# Patient Record
Sex: FEMALE | Race: WHITE
[De-identification: names, ages, dates, MRNs, and addresses within clinical notes are randomized per-mention and may not be internally consistent; named-entity substitution may affect disease eponyms.]

---

## 2020-12-31 ENCOUNTER — HOSPITAL ENCOUNTER (OUTPATIENT)
Dept: HOSPITAL 96 - M.LAB | Age: 50
End: 2020-12-31
Attending: SURGERY
Payer: COMMERCIAL

## 2020-12-31 DIAGNOSIS — Z20.828: ICD-10-CM

## 2020-12-31 DIAGNOSIS — Z01.812: Primary | ICD-10-CM

## 2021-01-06 ENCOUNTER — HOSPITAL ENCOUNTER (OUTPATIENT)
Dept: HOSPITAL 96 - M.SUR | Age: 51
Discharge: HOME | End: 2021-01-06
Attending: SURGERY
Payer: COMMERCIAL

## 2021-01-06 DIAGNOSIS — K80.10: Primary | ICD-10-CM

## 2021-01-06 DIAGNOSIS — R10.11: ICD-10-CM

## 2021-01-06 DIAGNOSIS — Z98.890: ICD-10-CM

## 2021-01-06 DIAGNOSIS — Z79.899: ICD-10-CM

## 2021-01-06 LAB
HCT VFR BLD CALC: 41.6 % (ref 37–47)
HGB BLD-MCNC: 14.1 GM/DL (ref 12–15)

## 2021-01-07 NOTE — OP
[address]  [address]                    OPERATIVE REPORT              
_______________________________________________________________________________
 
Name:       VERONICA DING                   Room:                      [facility]..#:  [id number]      Account #:      [id number]  
Admission:  01/06/21     Attend Phys:    HAI Kidd
Discharge:               Date of Birth:  [birth year]  
         Report #: [id number]
                                                                     [id number]  
_______________________________________________________________________________
THIS REPORT FOR:  
 
cc:  Tessy Villagran Kathleen M. DO                                          ~
     Chay Dickinson DO         
 
DATE OF SERVICE:  01/06/2021
 
 
SURGEON:  Chay Dickinson DO
 
ASSISTANT:  Dr. Emanuel Dallas DO.
 
PREOPERATIVE DIAGNOSIS:  Biliary colic.
 
POSTOPERATIVE DIAGNOSIS:  Biliary colic.
 
PROCEDURE PERFORMED:  Laparoscopic cholecystectomy.
 
INDICATIONS:  The patient is a 50-year-old female who presented as an outpatient
with complaints of postprandial right upper quadrant pain.  History of present
illness, physical exam, and imaging were consistent with biliary colic.  Risks,
benefits, and alternatives to surgery were discussed in detail.  Risks of
bleeding, infection, damage to the nearby structures including bowel and biliary
tree were discussed.  The patient voiced complete understanding and elected to
proceed with surgery.
 
DESCRIPTION OF PROCEDURE:  The patient was taken to the operating theater and
placed in the supine position.  Bilateral SCDs were placed and preoperative
antibiotics were given.  General anesthesia was induced without complication. 
The patient's abdomen was prepped and draped in the standard sterile fashion. 
Timeout was performed and all were in agreement.  An infraumbilical incision was
then made using an 11 blade scalpel.  Dissection was carried down to the midline
using electrocautery and S retractors.  Once the midline fascia was appreciated,
it was scored with electrocautery and elevated into the surgical field using
Kocher clamps.  The abdomen was entered using a hemostat.  Two 0 Vicryl stay
sutures were placed on either edge of the fascia and a Haritha trocar was
placed into
the
abdomen.  The abdomen was then insufflated to 15 mmHg.  Camera was introduced.
 No injury to intra-abdominal contents was appreciated.  The patient was placed
in the head up, left side down position.  A subxiphoid 5 mm port was placed
under direct visualization.  Next two 5 mm right subcostal ports were placed
under direct visualization.  Using the assistant port, the gallbladder fundus
was grasped and elevated towards the anterior abdominal wall.  There were some
omental adhesions to Umer's pouch and these were
 
 
 
[place] [address]                    OPERATIVE REPORT              
_______________________________________________________________________________
 
Name:       VERONICA DING                   [id number]      Account #:      [id number]  
Admission:  01/06/21     Attend Phys:    HAI Kidd
Discharge:               Date of Birth:  [birth year]  
         Report #: [id number]
                                                                     [id number]  
_______________________________________________________________________________
 
taken down using blunt and electrocautery
dissection.  Umer's pouch was then taken medially and cephalad and the
lateral peritoneum of the gallbladder was scored using electrocautery.  The
peritoneum was then incised laterally toward the liver and then carried over
Umer's pouch medially.
Umer's pouch was then taken inferolaterally was carried
over the medial aspect of the gallbladder.  Blunt dissection was then used to
sweep the peritoneum down exposing the hepatocystic triangle and two structures
entering the gallbladder.  The cystic duct and artery were then skeletonized
using blunt dissection clearing the hepatocystic triangle and fibrofatty tissue.
 A small rent in the gallbladder did occur during this process and the spilled
bile was suctioned out using a suction  device.  Once a critical view
of safety was obtained, there were only two structures entering the gallbladder.
 The hepatic plate could be seen within the hepatocystic triangle.  The cystic
artery was then clipped twice proximally and once distally.  The cystic duct was
too large for the 5 mm clip applier, so a 10 mm clip applier was introduced into
the abdomen through the subxiphoid port, which was upsized from a 5 mm to 12 mm
port.  Three clips were placed on the proximal cystic duct and one clip on the
distal cystic duct.  The
EndoShears were then used to transect the cystic artery and cystic duct between
the proximal and distal clips.  The gallbladder Umer's pouch was then raised
anteriorly and the gallbladder was removed from the gallbladder fossa using
electrocautery.  It was then placed in to an EndoCatch bag and placed aside. 
The gallbladder fossa was visualized and was hemostatic.  The clips were
visualized and there was no bile or blood leakage.  Next, the patient was placed
in the neutral position.  The right upper quadrant was thoroughly irrigated
until the effluent ran clear and suctioned dry.  The 5 mm ports were then
removed and the Haritha trocar and the midline was then removed under direct
visualization and the abdomen was completely desufflated.  The gallbladder and
EndoCatch bag were removed through the midline incision.  The midline fascia was
closed using an 0 Vicryl in a figure-of-eight fashion.  Prior to the removal of
the 5 mm ports,
the 12 mm subxiphoid port was removed and a PMI closure device was
used to close the fascia using 0 Vicryl.
The subcutaneous tissue was closed
using a 3-0 Vicryl.  All skin incisions were closed using a 4-0 Monocryl in an
interrupted inverted fashion.  All skin wounds were dressed with Dermabond. 
This concluded the procedure.  All sponge, needle and instrument counts were
correct x 2.
 
COMPLICATIONS:  None.
 
FINDINGS:  Omental adhesions.
 
SPECIMENS:  Gallbladder.
 
 
 
[place] [address]                    OPERATIVE REPORT              
_______________________________________________________________________________
 
Name:       VERONICA DING                   Room:                      [facility].#:  [id number]      Account #:      [id number]  
Admission:  01/06/21     Attend Phys:    HAI Kidd
Discharge:               Date of Birth:  [birth year]  
         Report #: [id number]
                                                                     [id number]  
_______________________________________________________________________________
 
 
ESTIMATED BLOOD LOSS:  20 mL.
 
ANESTHESIA:  General endotracheal anesthesia.
 
DRAINS:  None.
 
DISPOSITION:  The patient was extubated successfully in the operating theater
and taken to the PACU in stable condition.
 
 
 
 
 
 
 
 
 
 
 
 
 
 
 
 
 
 
 
 
 
 
 
 
 
 
 
 
 
 
 
 
 
 
<ELECTRONICALLY SIGNED>
                                        By:  Chay Dickinson DO        
01/07/21     1732
D: 01/06/21 1907_______________________________________
T: 01/06/21 1952Chay Dickinson DO           /nt

## 2021-01-12 NOTE — PATH
[address]  [address]                    PATHOLOGY RPT PROCEDURE       
_______________________________________________________________________________
 
Name:       VERONICA VILLAR                   Room:                      [facility].#:  [id number]      Account #:      [id number]  
Admission:  01/06/21     Date of Birth:  [birth year]  
Discharge:                             Report #:    [id number]
                                                         Path Case #: [id number]
_______________________________________________________________________________
 
[facility] Accession Number: [id number]
.                                                                01
Material submitted:                                        .
gallbladder - GALLBALDDER AND CONTENTS
.                                                                02
**********************************************************************
Diagnosis:
Gallbladder:
- Chronic cholecystitis, cholesterolosis and cholelithiasis.
(TAMARA:ann; 01/11/2021)
[facility]  01/11/2021  [facility]
**********************************************************************
.                                                                02
Electronically signed:                                     .
Junaid Main MD, Pathologist
NPI- [id number]
.                                                                01
Gross description:                                         .
Received in formalin labeled "Veronica Villar, gallbladder" is an intact
cholecystectomy specimen measuring 7.9 x 1.9 x 1.2 cm. The serosa is
smooth, tan-pink and glistening with a roughened hepatic bed and multiple
full-thickness defects located at the fundus and the hepatic bed ranging
from 0.2-1.6 cm in greatest dimension with the closest defect measuring
3.9 cm from the cystic duct margin. The specimen is opened to reveal
green-brown with yellow stippling mucosa and without polyps or masses. The
average wall thickness is 0.2 cm. There is a green-black calculus
measuring 1.1 x 0.7 x 0.7 cm obstructing the cystic duct.  There is no
lymph node grossly identified.  Representative sections of the fundus and
body and the cystic duct margin are submitted in A1. ([facility]; 1/8/2021)
GZA/GZA  01/11/2021  [facility] Local
.                                                                02
Pathologist provided ICD-10:
K80.10, K82.4
.                                                                02
CPT                                                        .
[id number]
Specimen Comment: A duplicate report has been generated due to demographic
updates.
***Performed at:  01
   [facility]
   [address], [place]  [id number]
   MD Tato Mathews MD Phone:  [phone]
***Performed at:  02
   [facility]
   [address], [place], [place]  [id number]
   MD Junaid Main MD Phone:  [phone]